# Patient Record
Sex: MALE | Race: WHITE
[De-identification: names, ages, dates, MRNs, and addresses within clinical notes are randomized per-mention and may not be internally consistent; named-entity substitution may affect disease eponyms.]

---

## 2019-08-01 ENCOUNTER — HOSPITAL ENCOUNTER (OUTPATIENT)
Age: 74
Discharge: HOME | End: 2019-08-01
Payer: OTHER GOVERNMENT

## 2019-08-01 DIAGNOSIS — I10: ICD-10-CM

## 2019-08-01 DIAGNOSIS — Z86.010: ICD-10-CM

## 2019-08-01 DIAGNOSIS — Z12.11: Primary | ICD-10-CM

## 2019-08-01 DIAGNOSIS — K64.8: ICD-10-CM

## 2019-08-01 DIAGNOSIS — F17.290: ICD-10-CM

## 2019-08-01 PROCEDURE — 45378 DIAGNOSTIC COLONOSCOPY: CPT

## 2019-08-01 PROCEDURE — 0DJD8ZZ INSPECTION OF LOWER INTESTINAL TRACT, VIA NATURAL OR ARTIFICIAL OPENING ENDOSCOPIC: ICD-10-PCS | Performed by: SURGERY

## 2020-01-27 ENCOUNTER — HOSPITAL ENCOUNTER (OUTPATIENT)
Dept: HOSPITAL 76 - DI | Age: 75
Discharge: HOME | End: 2020-01-27
Attending: PHYSICIAN ASSISTANT
Payer: OTHER GOVERNMENT

## 2020-01-27 DIAGNOSIS — Z12.2: Primary | ICD-10-CM

## 2020-01-27 DIAGNOSIS — Z13.6: ICD-10-CM

## 2020-01-27 DIAGNOSIS — Z87.891: ICD-10-CM

## 2020-01-27 PROCEDURE — 76706 US ABDL AORTA SCREEN AAA: CPT

## 2020-01-27 PROCEDURE — 71250 CT THORAX DX C-: CPT

## 2020-01-28 NOTE — ULTRASOUND REPORT
Reason:  PERSONAL HISTORY NICOTINE DEPENDENCE

Procedure Date:  01/27/2020   

Accession Number:  [id number] / [id number]                    

Procedure:  US  - Aorta Screening CPT Code:  

 

***Final Report***

 

 

FULL RESULT:

 

 

EXAM:

AORTIC DOPPLER ULTRASOUND

 

EXAM DATE: 1/27/2020 10:59 AM.

 

CLINICAL HISTORY: Personal history nicotine dependence.

 

COMPARISON: None.

 

TECHNIQUE: Real-time sonographic imaging of retroperitoneal vascular 

structures, including color-flow, Doppler flow and spectral analysis was 

performed by the sonographer. Multiple representative static images were 

saved for review.

 

FINDINGS:

Aorta: The abdominal aorta was adequately visualized. No evidence for 

abdominal aortic aneurysm.

 

Aorta:

Proximal: Sagittal AP 2.7 cm.

Mid: Transverse 2.1 x 2.3 cm.

Distal: Transverse 1.9 x 2.0 cm.

Caliber: WNL: Yes.

Plaque visualized: Yes.

 

Iliacs:

Right Iliac: Transverse 1.3 x 1.2 cm.

Left Iliac: Transverse 1.2 x 1.3 cm.

 

Iliac Vessels: The visualized proximal common iliac arteries are normal 

in caliber.

 

Other: None.

IMPRESSION: Normal. No abdominal aortic aneurysm.

 

[facility]

## 2021-12-17 ENCOUNTER — HOSPITAL ENCOUNTER (EMERGENCY)
Dept: HOSPITAL 76 - ED | Age: 76
Discharge: HOME | End: 2021-12-17
Payer: OTHER GOVERNMENT

## 2021-12-17 VITALS — DIASTOLIC BLOOD PRESSURE: 83 MMHG | SYSTOLIC BLOOD PRESSURE: 160 MMHG

## 2021-12-17 DIAGNOSIS — I10: ICD-10-CM

## 2021-12-17 DIAGNOSIS — M10.062: Primary | ICD-10-CM

## 2021-12-17 PROCEDURE — 99283 EMERGENCY DEPT VISIT LOW MDM: CPT

## 2021-12-17 PROCEDURE — 73564 X-RAY EXAM KNEE 4 OR MORE: CPT

## 2021-12-17 NOTE — ED PHYSICIAN DOCUMENTATION
PD HPI LOWER EXT INJURY





- Stated complaint


Stated Complaint: L KNEE PX





- Chief complaint


Chief Complaint: Ext Problem





- History obtained from


History obtained from: Patient





- History of Present Illness


PD HPI LOW EXT INJURY LOCATION: Left, Knee


Type of injury: No: Fall, Twist


Where injury occurred: Home


Timing - onset: How many weeks ago (1)


Timing - duration: Weeks (1)


Timing - details: Gradual onset, Still present


Worsened by: Moving, Palpating


Associated symptoms: Swelling, Discolored (redness).  No: Weakness, Numbness


Contributing factors: No: Anticoagulated, Prior ortho surgery


Similar symptoms before: Has not had sx before (has had gout in toe and foot, 

but not in the knee previously.)


Recently seen: Not recently seen





Review of Systems


Constitutional: denies: Fever, Chills


Skin: denies: Rash, Lesions, Abrasion (s), Laceration (s)





PD PAST MEDICAL HISTORY





- Past Medical History


Cardiovascular: Hypertension


Respiratory: None


Endocrine/Autoimmune: None


GI: None


: Benign prostate hypertrophy


HEENT: None


Psych: None


Musculoskeletal: None


Derm: None





- Past Surgical History


General: Colonoscopy





- Present Medications


Home Medications: 


                                Ambulatory Orders











 Medication  Instructions  Recorded  Confirmed


 


Chlorthalidone 25 mg PO DAILY 07/31/19 08/01/19


 


Tamsulosin [Flomax] 0.4 mg PO DAILY 07/31/19 07/31/19


 


lisinopriL [Lisinopril] 10 mg PO DAILY 07/31/19 08/01/19


 


Colchicine 0.6 mg PO Q4H PRN #10 tablet 12/17/21 


 


dexAMETHasone [Decadron] 4 mg PO DAILY #5 tablet 12/17/21 


 


oxyCODONE [Roxicodone] 5 mg PO Q6H PRN #10 tablet 12/17/21 














- Allergies


Allergies/Adverse Reactions: 


                                    Allergies











Allergy/AdvReac Type Severity Reaction Status Date / Time


 


No Known Drug Allergies Allergy   Verified 12/17/21 15:28














PD ED PE NORMAL





- Vitals


Vital signs reviewed: Yes





- General


General: Alert and oriented X 3, No acute distress, Well developed/nourished





- Derm


Derm: Normal color, Warm and dry





- Extremities


Extremities: Other (left knee with redness and mild swelling without effusion 

anteriorly. No joint effusion. No skin sores. Seems c/w gout or prepatellar 

bursitis (though no effusion). )





- Neuro


Neuro: No motor deficit, No sensory deficit





Results





- Vitals


Vitals: 


                                     Oxygen











O2 Source                      Room air

















- Rads (name of study)


  ** right knee


Radiology: Prelim report reviewed (no fractures nor acute process. ), See rad 

report





PD MEDICAL DECISION MAKING





- ED course


Complexity details: reviewed results, considered differential (likely gout of 

the knee. consider prepatellar bursitis, but no effusion. ), d/w patient





Departure





- Departure


Disposition: 01 Home, Self Care


Clinical Impression: 


Acute gout


Qualifiers:


 Gout site: knee Gout etiology: unspecified cause Laterality: left Qualified 

Code(s): M10.9 - Gout, unspecified





Knee pain


Qualifiers:


 Chronicity: acute Laterality: left Qualified Code(s): M25.562 - Pain in left 

knee





Condition: Stable


Record reviewed to determine appropriate education?: Yes


Instructions:  ED Arthritis Gout


Follow-Up: 


Pranay Carson DO [Provider Admit Priv/Credential] - 


Prescriptions: 


Colchicine 0.6 mg PO Q4H PRN #10 tablet


 PRN Reason: Pain


dexAMETHasone [Decadron] 4 mg PO DAILY #5 tablet


oxyCODONE [Roxicodone] 5 mg PO Q6H PRN #10 tablet


 PRN Reason: Pain


Comments: 


This looks and sounds most likely to be gout in the knee.  Alternative of 

prepatellar bursitis would be treated similarly with anti-inflammatories and 

pain meds.





We can go with the colchicine tablet 1 every 3-4 hours up to 4 tablets today 

into tomorrow.  You could then use it twice daily for a few days as well if 

needed.  You can also use dexamethasone steroid anti-inflammatory which is 

similar to prednisone that you had heard of.  This daily for a few days until 

better as well.





To that add Tylenol every 4-6 hours for pain or oxycodone as needed for worse 

pain.





I would anticipate improvement over the next couple of days and resolution by 3 

to 4 days.





Recheck if not better in that timeframe.





I transmitted your prescriptions to [facility] pharmacy in [place].





I am prescribing a short course of narcotic pain medication for you.  These are 

potentially dangerous and addictive medications that should be used carefully.


These medications may constipate you.  Take an over-the-counter stool softener 

such as docusate twice daily with plenty of water while taking these 

medications.  If you go 24 hours without a bowel movement, take over-the-counter

 MiraLAX, per package instructions.


Do not drink or drive while taking these medications.


If you received narcotic or sedating medications while in the emergency 

department do not drive for 24 hours.  Store this medication in a safe, secure 

place and out of reach of children.


It is a violation of federal law to give or sell this medication to another 

person or to use in a manner other than prescribed.


The ED will not refill narcotic prescriptions, including prescriptions lost or 

stolen.  You can dispose of unwanted medications at the [facility]'s office 

or at several pharmacies such as [facility].


Discharge Date/Time: 12/17/21 17:39

## 2021-12-17 NOTE — XRAY REPORT
PROCEDURE:  Knee 4 View LT

 

INDICATIONS:  Trauma

 

TECHNIQUE:  3 views of the left knee(s) were acquired.  

 

COMPARISON:  None.

 

FINDINGS:  

 

Bones:  No fractures or dislocations.  No suspicious bony lesions. Moderate medial and mild lateral j
oint space narrowing. 

 

Soft tissues: Small joint effusion.  No suspicious soft tissue calcifications.  

 

IMPRESSION:  Moderate joint space narrowing and small joint effusion noted. No fracture or malalignme
nt.

 

Reviewed by: Clark Warner MD on 12/17/2021 3:04 PM AK

Approved by: Clark Warner MD on 12/17/2021 3:04 PM AK

 

 

Station ID:  [id number]

## 2022-12-12 ENCOUNTER — HOSPITAL ENCOUNTER (EMERGENCY)
Dept: HOSPITAL 76 - ED | Age: 77
Discharge: HOME | End: 2022-12-12
Payer: OTHER GOVERNMENT

## 2022-12-12 VITALS — SYSTOLIC BLOOD PRESSURE: 142 MMHG | DIASTOLIC BLOOD PRESSURE: 73 MMHG

## 2022-12-12 DIAGNOSIS — U07.1: Primary | ICD-10-CM

## 2022-12-12 LAB
ANION GAP SERPL CALCULATED.4IONS-SCNC: 10 MMOL/L (ref 6–13)
BUN SERPL-MCNC: 18 MG/DL (ref 6–20)
CALCIUM UR-MCNC: 8.9 MG/DL (ref 8.5–10.3)
CHLORIDE SERPL-SCNC: 102 MMOL/L (ref 101–111)
CO2 SERPL-SCNC: 26 MMOL/L (ref 21–32)
CREAT SERPLBLD-SCNC: 1.3 MG/DL (ref 0.6–1.2)
GFRSERPLBLD MDRD-ARVRAT: 54 ML/MIN/{1.73_M2} (ref 89–?)
GLUCOSE SERPL-MCNC: 107 MG/DL (ref 70–100)
POTASSIUM SERPL-SCNC: 4 MMOL/L (ref 3.5–5)
SODIUM SERPLBLD-SCNC: 138 MMOL/L (ref 135–145)

## 2022-12-12 PROCEDURE — 99283 EMERGENCY DEPT VISIT LOW MDM: CPT

## 2022-12-12 PROCEDURE — 99282 EMERGENCY DEPT VISIT SF MDM: CPT

## 2022-12-12 PROCEDURE — 80048 BASIC METABOLIC PNL TOTAL CA: CPT

## 2022-12-12 PROCEDURE — 36415 COLL VENOUS BLD VENIPUNCTURE: CPT

## 2022-12-12 NOTE — ED PHYSICIAN DOCUMENTATION
History of Present Illness





- Stated complaint


Stated Complaint: CHEST TIGHT,SORE THROAT





- Chief complaint


Chief Complaint: Resp





- History obtained from


History obtained from: Patient





- History of Present Illness


Timing: How many days ago (2)


Pain level max: 3


Pain level now: 3





- Additonal information


Additional information: 





77-year-old male presents to the emergency department stating he had a positive 

COVID test 2 days ago. He states continued cough, sore throat.  Has not taken 

anything for this.  No nausea or vomiting.  No rash.  Nothing makes it better or

worse. He is fully vaccinated including boosters.  He is interested in 

discussing Paxlovid.





Review of Systems


Constitutional: denies: Fever, Chills


Cardiac: denies: Chest pain / pressure, Palpitations


Respiratory: denies: Cough


GI: denies: Nausea, Vomiting, Diarrhea


Skin: denies: Rash


Musculoskeletal: denies: Neck pain, Back pain


Neurologic: denies: Headache





PD PAST MEDICAL HISTORY





- Past Medical History


Cardiovascular: Hypertension


Respiratory: None


Endocrine/Autoimmune: None


GI: None


: Benign prostate hypertrophy


HEENT: None


Psych: None


Musculoskeletal: None


Derm: None





- Past Surgical History


General: Colonoscopy





- Present Medications


Home Medications: 


                                Ambulatory Orders











 Medication  Instructions  Recorded  Confirmed


 


Chlorthalidone 25 mg PO DAILY 07/31/19 08/01/19


 


Tamsulosin [Flomax] 0.4 mg PO DAILY 07/31/19 07/31/19


 


lisinopriL [Lisinopril] 10 mg PO DAILY 07/31/19 08/01/19


 


Colchicine 0.6 mg PO Q4H PRN #10 tablet 12/17/21 


 


dexAMETHasone [Decadron] 4 mg PO DAILY #5 tablet 12/17/21 


 


oxyCODONE [Roxicodone] 5 mg PO Q6H PRN #10 tablet 12/17/21 














- Allergies


Allergies/Adverse Reactions: 


                                    Allergies











Allergy/AdvReac Type Severity Reaction Status Date / Time


 


No Known Drug Allergies Allergy   Verified 12/12/22 12:19














PD ED PE NORMAL





- Vitals


Vital signs reviewed: Yes





- General


General: Alert and oriented X 3, No acute distress





- HEENT


HEENT: Ears normal, Moist mucous membranes, Pharynx benign, Dentition benign





- Neck


Neck: Supple, no meningeal sign





- Cardiac


Cardiac: RRR, Strong equal pulses





- Respiratory


Respiratory: No respiratory distress, Clear bilaterally





- Abdomen


Abdomen: Soft, Non tender, Non distended





- Derm


Derm: Warm and dry, No rash





- Extremities


Extremities: No edema





- Neuro


Neuro: Alert and oriented X 3





- Psych


Psych: Normal mood, Normal affect





Results





- Vitals


Vitals: 


                               Vital Signs - 24 hr











  12/12/22 12/12/22





  12:14 16:13


 


Temperature 36.3 C L 37.0 C


 


Heart Rate 80 77


 


Respiratory 24 16





Rate  


 


Blood Pressure 116/67 142/73 H


 


O2 Saturation 96 97








                                     Oxygen











O2 Source                      Room air

















- Labs


Labs: 


                                Laboratory Tests











  12/12/22





  14:25


 


Sodium  138


 


Potassium  4.0


 


Chloride  102


 


Carbon Dioxide  26


 


Anion Gap  10.0


 


BUN  18


 


Creatinine  1.3 H


 


Estimated GFR (MDRD)  54 L


 


Glucose  107 H


 


Calcium  8.9














PD MEDICAL DECISION MAKING





- ED course


Complexity details: reviewed results, re-evaluated patient, considered 

differential, d/w patient


ED course: 





Patient is well-appearing, nontoxic.  Afebrile.  No hypoxia.  No respiratory 

distress.  He is fully vaccinated and boosted.  Does not have any history of 

lung issues.  Other than his age does not have any significant risk factors for 

progression to hospitalization.  We did discuss Paxlovid, risks and benefits.  

He declines Paxlovid at this time.  I think this is reasonable in an otherwise 

healthy patient, besides hypertension.  Patient counseled regarding signs and 

symptoms for which I believe and urgent re-evaluation would be necessary. 

Patient with good understanding of and agreement to plan and is comfortable 

going home at this time





This document was made in part using voice recognition software. While efforts 

are made to proofread this document, sound alike and grammatical errors may 

occur.





Departure





- Departure


Disposition: 01 Home, Self Care


Clinical Impression: 


 COVID





Condition: Good


Instructions:  ED Viral Syndrome


Follow-Up: 


JAVI THOMAS PA-C [Primary Care Provider] - As Needed


Comments: 


This should improve over the next few days.  Please return if you worsen.  Drink

plenty of fluids at home.  You can use cough medication, Tylenol and Motrin as 

needed.


Discharge Date/Time: 12/12/22 18:57